# Patient Record
Sex: MALE | Race: WHITE | Employment: OTHER | URBAN - METROPOLITAN AREA
[De-identification: names, ages, dates, MRNs, and addresses within clinical notes are randomized per-mention and may not be internally consistent; named-entity substitution may affect disease eponyms.]

---

## 2017-02-16 RX ORDER — METOPROLOL SUCCINATE 200 MG/1
TABLET, EXTENDED RELEASE ORAL
Qty: 30 TABLET | Refills: 2 | Status: SHIPPED | OUTPATIENT
Start: 2017-02-16 | End: 2017-05-30

## 2017-04-14 NOTE — PROGRESS NOTES
Ann Navarro is a 68year old male. cc hypertension, hyperlipidemia, dementia, atrial fibrillation, pacemaker, renal insufficiency  HPI:   HTN - doing well with medications. No side effects. BP is controlled at home. Takes meds regularly.  Needs refi Take 1,000 mcg by mouth daily.  Disp:  Rfl:       Past Medical History   Diagnosis Date   • Herpes zoster without mention of complication    • Fitting and adjustment of cardiac pacemaker    • Personal history of pneumonia (recurrent)    • Other B-complex de meds.   Watch diet for fats and carbs. Stay active. Forward blood test results when done by cardiologist.  Increase hydration recommended 64 ounces of water every day. Continue activity and walking every day.   Call for refills of the medication as tello

## 2017-05-03 RX ORDER — MEMANTINE HYDROCHLORIDE 28 MG/1
CAPSULE, EXTENDED RELEASE ORAL
Qty: 90 CAPSULE | Refills: 0 | Status: SHIPPED | OUTPATIENT
Start: 2017-05-03 | End: 2017-08-07

## 2017-06-01 RX ORDER — METOPROLOL SUCCINATE 200 MG/1
TABLET, EXTENDED RELEASE ORAL
Qty: 90 TABLET | Refills: 0 | Status: SHIPPED | OUTPATIENT
Start: 2017-06-01 | End: 2017-06-02

## 2017-06-02 ENCOUNTER — TELEPHONE (OUTPATIENT)
Dept: FAMILY MEDICINE CLINIC | Facility: CLINIC | Age: 77
End: 2017-06-02

## 2017-06-02 RX ORDER — METOPROLOL SUCCINATE 200 MG/1
TABLET, EXTENDED RELEASE ORAL
Qty: 90 TABLET | Refills: 0 | Status: SHIPPED | OUTPATIENT
Start: 2017-06-02 | End: 2017-11-15

## 2017-06-02 NOTE — TELEPHONE ENCOUNTER
Pt is looking for refill of METOPROLOL SUCCINATE  MG Oral Tablet 24 Hr. Please see request from 6/1/17. Pt is almost out of the medication and wants to make sure has it for weekend.

## 2017-07-17 ENCOUNTER — TELEPHONE (OUTPATIENT)
Dept: FAMILY MEDICINE CLINIC | Facility: CLINIC | Age: 77
End: 2017-07-17

## 2017-07-17 ENCOUNTER — APPOINTMENT (OUTPATIENT)
Dept: GENERAL RADIOLOGY | Facility: HOSPITAL | Age: 77
End: 2017-07-17
Attending: EMERGENCY MEDICINE
Payer: MEDICARE

## 2017-07-17 ENCOUNTER — APPOINTMENT (OUTPATIENT)
Dept: CT IMAGING | Facility: HOSPITAL | Age: 77
End: 2017-07-17
Attending: EMERGENCY MEDICINE
Payer: MEDICARE

## 2017-07-17 PROCEDURE — 71010 XR CHEST AP PORTABLE  (CPT=71010): CPT | Performed by: EMERGENCY MEDICINE

## 2017-07-17 PROCEDURE — 70450 CT HEAD/BRAIN W/O DYE: CPT | Performed by: EMERGENCY MEDICINE

## 2017-07-17 NOTE — ED NOTES
Pt up to bathroom with assist from wife. Steady gait . Pt denies pain at this time. Awaiting ct scan results.

## 2017-07-17 NOTE — ED INITIAL ASSESSMENT (HPI)
Pt here with family whom stating pt has alzheimers and forgot how to get dressed or shower this morning. Pt mentioned to pt he felt funny from waist up. Son asked him if he was having chest pressure and pt answered yes. Pt had similar episode on Friday.

## 2017-07-17 NOTE — TELEPHONE ENCOUNTER
Pt went for walk Fri came home wife advised to take a shower he was very sweaty he took a shower and got out confused and even asked Em Vazquezer you my wife? \" he has been off since and this morning he could not figure out how to take a shower  And even get dres

## 2017-07-17 NOTE — ED PROVIDER NOTES
Patient Seen in: [facility] Emergency Department    History   Patient presents with:  Altered Mental Status (neurologic)    Stated Complaint: confusion    HPI    The patient is a 22-year-old male with a history of Alzheimer's disease, who resides at daily. Calcium Polycarbophil (FIBER LAXATIVE OR),  Take  by mouth. Coenzyme Q10 (CO Q-10) 200 MG Oral Cap,  Take  by mouth. Vitamin B-12 (VITAMIN B12) 1000 MCG Oral Tab,  Take 1,000 mcg by mouth daily.        Family History   Problem Relation Age of O auscultation bilaterally no wheezes or rales  Abdomen: Normoactive bowel sounds. Soft, nondistended. No HSM or masses. Nontender throughout abdomen. No CVA tenderness.   Extremities: No deformity, nontender throughout, and normal active range of motion of a on continuous pulse oximetry and cardiac telemetry. Blood was obtained and peripheral IV access was established. Chest x-ray was obtained.     [facility]       Xr Chest Ap Portable  (cpt=71010)    Result Date: 7/17/2017  PROCEDURE:  XR CHEST AP PORTABLE (CPT=710

## 2017-07-17 NOTE — TELEPHONE ENCOUNTER
Below notes \"stock\" should say stroke. S/w wife. Advised based on these sx's pt needs to be evaluated in the ER d/t hx. She voiced understanding and agrees.

## 2017-08-07 RX ORDER — MEMANTINE HYDROCHLORIDE 28 MG/1
CAPSULE, EXTENDED RELEASE ORAL
Qty: 90 CAPSULE | Refills: 0 | Status: SHIPPED | OUTPATIENT
Start: 2017-08-07 | End: 2017-11-06

## 2017-08-09 RX ORDER — MEMANTINE HYDROCHLORIDE 28 MG/1
CAPSULE, EXTENDED RELEASE ORAL
Qty: 90 CAPSULE | Refills: 0 | OUTPATIENT
Start: 2017-08-09

## 2017-08-28 NOTE — TELEPHONE ENCOUNTER
Please call pt for follow up medication with Dr. Randy Vargas prior to refill. Thanks. Was asked to come back in this fall.

## 2017-08-31 RX ORDER — METOPROLOL SUCCINATE 200 MG/1
TABLET, EXTENDED RELEASE ORAL
Qty: 90 TABLET | Refills: 0 | Status: SHIPPED | OUTPATIENT
Start: 2017-08-31 | End: 2017-09-07

## 2017-09-07 NOTE — PATIENT INSTRUCTIONS
Continue current meds. Watch diet for fats and carbs. Stay active. Do fasting blood work for wellness visit.

## 2017-09-07 NOTE — PROGRESS NOTES
John Pillai is a 68year old male. cc cholesterol elevated, Alzheimer dementia, history of stroke A. fib. HPI:   Hyperlipidemia patient is taking pravastatin doing well with medication.   He will repeat blood work for cholesterol before his next vi mention of complication    • High cholesterol    • Other B-complex deficiencies    • Pacemaker     Card Dr Rhina Bay   • Personal history of pneumonia (recurrent)    • Unspecified essential hypertension       Social History:  Smoking status: Former Smoker Chloride 107 101 - 111 mmol/L   CO2 29.0 22.0 - 32.0 mmol/L   -TROPONIN I   Result Value Ref Range   Troponin <0.046 <0.046 ng/mL   -URINALYSIS WITH CULTURE REFLEX   Result Value Ref Range   Urine Color Straw Yellow   Clarity Urine Clear Clear   Spec Gra %   Monocyte % 10.7 %   Eosinophil % 3.2 %   Basophil % 0.8 %   Immature Granulocyte % 0.1 %     CT of the brain, chest x-ray from July 17, 2017 ER visit reviewed.     ASSESSMENT AND PLAN:   Pure hypercholesterolemia  Late onset alzheimer's disease without

## 2017-09-28 DIAGNOSIS — R17 ELEVATED BILIRUBIN: ICD-10-CM

## 2017-09-28 DIAGNOSIS — N28.9 RENAL INSUFFICIENCY: Primary | ICD-10-CM

## 2017-11-08 RX ORDER — MEMANTINE HYDROCHLORIDE 28 MG/1
CAPSULE, EXTENDED RELEASE ORAL
Qty: 90 CAPSULE | Refills: 1 | Status: SHIPPED | OUTPATIENT
Start: 2017-11-08 | End: 2017-11-15

## 2017-11-08 NOTE — TELEPHONE ENCOUNTER
NAMENDA XR 28MG CAP    Not a per protocol medication.     Next OV is on 11*15*17    Rx is pending for your approval.    Please sign,    Thank you

## 2017-11-15 ENCOUNTER — OFFICE VISIT (OUTPATIENT)
Dept: FAMILY MEDICINE CLINIC | Facility: CLINIC | Age: 77
End: 2017-11-15

## 2017-11-15 VITALS
DIASTOLIC BLOOD PRESSURE: 80 MMHG | WEIGHT: 200 LBS | HEIGHT: 73 IN | RESPIRATION RATE: 12 BRPM | SYSTOLIC BLOOD PRESSURE: 130 MMHG | BODY MASS INDEX: 26.51 KG/M2 | TEMPERATURE: 97 F | HEART RATE: 88 BPM

## 2017-11-15 DIAGNOSIS — R17 ELEVATED BILIRUBIN: ICD-10-CM

## 2017-11-15 DIAGNOSIS — Z00.00 ENCOUNTER FOR ANNUAL HEALTH EXAMINATION: Primary | ICD-10-CM

## 2017-11-15 DIAGNOSIS — F02.80 LATE ONSET ALZHEIMER'S DISEASE WITHOUT BEHAVIORAL DISTURBANCE (HCC): ICD-10-CM

## 2017-11-15 DIAGNOSIS — I48.20 CHRONIC ATRIAL FIBRILLATION (HCC): ICD-10-CM

## 2017-11-15 DIAGNOSIS — G30.1 LATE ONSET ALZHEIMER'S DISEASE WITHOUT BEHAVIORAL DISTURBANCE (HCC): ICD-10-CM

## 2017-11-15 DIAGNOSIS — E78.00 PURE HYPERCHOLESTEROLEMIA: ICD-10-CM

## 2017-11-15 DIAGNOSIS — N28.9 RENAL INSUFFICIENCY: ICD-10-CM

## 2017-11-15 DIAGNOSIS — Z95.0 CARDIAC PACEMAKER IN SITU: ICD-10-CM

## 2017-11-15 PROCEDURE — 99214 OFFICE O/P EST MOD 30 MIN: CPT | Performed by: FAMILY MEDICINE

## 2017-11-15 PROCEDURE — G0444 DEPRESSION SCREEN ANNUAL: HCPCS | Performed by: FAMILY MEDICINE

## 2017-11-15 PROCEDURE — G0439 PPPS, SUBSEQ VISIT: HCPCS | Performed by: FAMILY MEDICINE

## 2017-11-15 RX ORDER — PRAVASTATIN SODIUM 40 MG
TABLET ORAL
Qty: 90 TABLET | Refills: 1 | Status: SHIPPED | OUTPATIENT
Start: 2017-11-15 | End: 2018-05-18

## 2017-11-15 RX ORDER — METOPROLOL SUCCINATE 200 MG/1
TABLET, EXTENDED RELEASE ORAL
Qty: 90 TABLET | Refills: 1 | Status: SHIPPED | OUTPATIENT
Start: 2017-11-15 | End: 2018-05-18

## 2017-11-15 RX ORDER — MEMANTINE HYDROCHLORIDE 28 MG/1
CAPSULE, EXTENDED RELEASE ORAL
Qty: 90 CAPSULE | Refills: 1 | Status: SHIPPED | OUTPATIENT
Start: 2017-11-15 | End: 2018-05-18

## 2017-11-15 NOTE — PATIENT INSTRUCTIONS
Continue current meds. Watch diet for fats and carbs. Stay active. Aby Gonzalez's SCREENING SCHEDULE   Tests on this list are recommended by your physician but may not be covered, or covered at this frequency, by your insurer.  Please check wi Screening Covered up to Age 76     Colonoscopy Screen   Covered every 10 years- more often if abnormal There are no preventive care reminders to display for this patient.  Update Health Maintenance if applicable    Flex Sigmoidoscopy Screen  Covered every 5 Tetanus Toxoid- Only covered with a cut with metal- TD and TDaP Not covered by Medicare Part B) No orders found for this or any previous visit.  This may be covered with your prescription benefits, but Medicare does not cover unless Medically needed    [facility]ost

## 2017-11-15 NOTE — PROGRESS NOTES
HPI:   Chi Chatterjee is a 68year old male who presents for a Medicare Subsequent Annual Wellness visit (Pt already had Initial Annual Wellness) also follow-up on hyperlipidemia A. fib cardiac pacemaker, renal insufficiency, dementia elevated bili behavioral disturbance      Last Cholesterol Labs:     Lab Results  Component Value Date   CHOLEST 172 09/27/2017   HDL 59 09/27/2017   LDL 98 09/27/2017   TRIG 67 09/27/2017          Last Chemistry Labs:     Lab Results  Component Value Date   AST 19 11/0 father. SOCIAL HISTORY:   He  reports that he quit smoking about 47 years ago. He does not have any smokeless tobacco history on file. He reports that he drinks alcohol. He reports that he does not use drugs.      REVIEW OF SYSTEMS:   GENERAL: feels well normal, no CVA tenderness   Lungs:   Clear to auscultation bilaterally, respirations unlabored   Chest Wall:  No tenderness or deformity   Heart:  Regular rate and rhythm, S1, S2 normal, no murmur,   Abdomen:   Soft, non-tender, bowel sounds active all fou Memantine HCl ER (NAMENDA XR) 28 MG Oral Capsule SR 24 Hr; TAKE ONE CAPSULE BY MOUTH ONCE DAILY    Pure hypercholesterolemia  -     Pravastatin Sodium 40 MG Oral Tab;  Take 1 tablet by mouth  daily    Chronic atrial fibrillation (HCC)  -     Metoprolol Appropriate Exercise    How would you describe your daily physical activity?: Moderate    How would you describe your current health state?: Good    How do you maintain positive mental well-being?: Social Interaction    If you are a male age 38-65 or a fem section provided for quick review of chart, separate sheet to patient  [address] Internal Lab or Procedure External Lab or Procedure   Diabetes Screening      HbgA1C   Annually No results found for: A1C    No flowsheet POTASSIUM (mmol/L)   Date Value   11/08/2017 5.0    No flowsheet data found. Creatinine  Annually CREATININE (mg/dL)   Date Value   11/08/2017 1.14    No flowsheet data found.     Drug Serum Conc  Annually No results found for: DIGOXIN, DIG, VALP No flow

## 2017-12-08 ENCOUNTER — TELEPHONE (OUTPATIENT)
Dept: FAMILY MEDICINE CLINIC | Facility: CLINIC | Age: 77
End: 2017-12-08

## 2017-12-08 ENCOUNTER — HOSPITAL ENCOUNTER (EMERGENCY)
Facility: HOSPITAL | Age: 77
Discharge: HOME | End: 2017-12-08
Attending: EMERGENCY MEDICINE
Payer: MEDICARE

## 2017-12-08 ENCOUNTER — APPOINTMENT (OUTPATIENT)
Dept: GENERAL RADIOLOGY | Facility: HOSPITAL | Age: 77
End: 2017-12-08
Attending: EMERGENCY MEDICINE
Payer: MEDICARE

## 2017-12-08 VITALS
HEIGHT: 72 IN | RESPIRATION RATE: 16 BRPM | WEIGHT: 198 LBS | OXYGEN SATURATION: 100 % | HEART RATE: 74 BPM | TEMPERATURE: 98 F | SYSTOLIC BLOOD PRESSURE: 124 MMHG | BODY MASS INDEX: 26.82 KG/M2 | DIASTOLIC BLOOD PRESSURE: 66 MMHG

## 2017-12-08 DIAGNOSIS — F02.80 ALZHEIMER'S DEMENTIA WITHOUT BEHAVIORAL DISTURBANCE, UNSPECIFIED TIMING OF DEMENTIA ONSET (HCC): ICD-10-CM

## 2017-12-08 DIAGNOSIS — R06.00 DOE (DYSPNEA ON EXERTION): Primary | ICD-10-CM

## 2017-12-08 DIAGNOSIS — G30.9 ALZHEIMER'S DEMENTIA WITHOUT BEHAVIORAL DISTURBANCE, UNSPECIFIED TIMING OF DEMENTIA ONSET (HCC): ICD-10-CM

## 2017-12-08 PROCEDURE — 81003 URINALYSIS AUTO W/O SCOPE: CPT | Performed by: EMERGENCY MEDICINE

## 2017-12-08 PROCEDURE — 80053 COMPREHEN METABOLIC PANEL: CPT | Performed by: EMERGENCY MEDICINE

## 2017-12-08 PROCEDURE — 82962 GLUCOSE BLOOD TEST: CPT

## 2017-12-08 PROCEDURE — 85025 COMPLETE CBC W/AUTO DIFF WBC: CPT

## 2017-12-08 PROCEDURE — 93010 ELECTROCARDIOGRAM REPORT: CPT

## 2017-12-08 PROCEDURE — 85025 COMPLETE CBC W/AUTO DIFF WBC: CPT | Performed by: EMERGENCY MEDICINE

## 2017-12-08 PROCEDURE — 36415 COLL VENOUS BLD VENIPUNCTURE: CPT

## 2017-12-08 PROCEDURE — 71010 XR CHEST AP PORTABLE  (CPT=71010): CPT | Performed by: EMERGENCY MEDICINE

## 2017-12-08 PROCEDURE — 99285 EMERGENCY DEPT VISIT HI MDM: CPT

## 2017-12-08 PROCEDURE — 80053 COMPREHEN METABOLIC PANEL: CPT

## 2017-12-08 PROCEDURE — 84484 ASSAY OF TROPONIN QUANT: CPT | Performed by: EMERGENCY MEDICINE

## 2017-12-08 PROCEDURE — 93005 ELECTROCARDIOGRAM TRACING: CPT

## 2017-12-08 RX ORDER — ASPIRIN 81 MG/1
324 TABLET, CHEWABLE ORAL ONCE
Status: COMPLETED | OUTPATIENT
Start: 2017-12-08 | End: 2017-12-08

## 2017-12-08 NOTE — TELEPHONE ENCOUNTER
The patient having heart condition that needs to be checked . The best choice would be to go to the emergency room for evaluation. They can do the imaging test EKG make sure things are okay and get a blood work done right away.   I understand patient ruizi

## 2017-12-08 NOTE — TELEPHONE ENCOUNTER
Pt wife transferred to nurse last night pt was much more confused than normal. He went on a walk like normal this morning and became very short of breath and dizzy.

## 2017-12-08 NOTE — TELEPHONE ENCOUNTER
Spoke with shanae (ruthie)/spouse-listed on [employer] consent-reports pt had onset approx 5pm yesterday of increase in confusion. Khadijah Dutta was talking about our house but did not seem to realize that is where he was-said 'i have a house just like this one'.  Wa

## 2017-12-08 NOTE — ED INITIAL ASSESSMENT (HPI)
Patient arrives from home with wife who reports increased confusion last night worse than typical Alzheimer's symptoms, associated with agitation.  Wife reports patient seemed back to baseline this am. Reports while on a walk today, patient c/o difficulty b

## 2017-12-08 NOTE — ED NOTES
Pt reevaluated by er physician. Pt and family all informed of all test reports and plan of care. All verbalizing understanding.

## 2017-12-08 NOTE — TELEPHONE ENCOUNTER
Per discussion with dr [facility] with increased confusion and additional new symptoms this morning, recommend ER for quickest evaluation and treatment. Should prob have labs, poss cxr to r/o pneumonia and check urine for poss uti.   Call to ruthie/otilio

## 2017-12-08 NOTE — ED PROVIDER NOTES
Patient Seen in: [facility] Emergency Department    History   Patient presents with:  Dizziness (neurologic)  Dyspnea TIM SOB (respiratory)    Stated Complaint: dizzy, short of breath confusion    HPI  Patient is a 69-year-old male who has a history of breath confusion  Other systems are as noted in HPI. Constitutional and vital signs reviewed. All other systems reviewed and negative except as noted above.     Physical Exam   ED Triage Vitals [12/08/17 1143]  BP: 126/80  Pulse: 93  Resp: 20  Temp Status                     ---------                               -----------         ------                     CBC W/ DIFFERENTIAL[[id number]]          Abnormal            Final result                 Please view results for these tests on the individual onset    Disposition:  Discharge  12/8/2017  2:42 pm    Follow-up:  Dena Lopez  [address] [id number] [phone]    In 3 days          Medications Prescribed:  Discharge Medication List as of 12/8/2017  2:45 PM

## 2018-02-14 ENCOUNTER — TELEPHONE (OUTPATIENT)
Dept: FAMILY MEDICINE CLINIC | Facility: CLINIC | Age: 78
End: 2018-02-14

## 2018-02-14 NOTE — TELEPHONE ENCOUNTER
Wife called, stated that she is over whelmed. Her  has been declining since December. He no longer recognizes her as his wife (they have been  over 48 years).   Pt thinks that she is his care giver and he keeps getting agitated and asking he

## 2018-05-18 NOTE — PROGRESS NOTES
Roe Charo is a 68year old male. cc cholesterol elevated, Alzheimer dementia, history of stroke A. fib. HPI:   Hyperlipidemia patient is taking pravastatin doing well with medication.   He will repeat blood work for [employer] Personal history of pneumonia (recurrent)    • Unspecified essential hypertension       Social History:  Smoking status: Former Smoker                                                              Packs/day: 0.00      Years: 0.00         Quit date: 1/1/1970 -TROPONIN I   Result Value Ref Range   Troponin <0.046 <0.046 ng/mL   -URINALYSIS WITH CULTURE REFLEX   Result Value Ref Range   Urine Color Yellow Yellow   Clarity Urine Clear Clear   Spec Gravity 1.009 1.001 - 1.030   Glucose Urine Negative Negative mg % 22.4 %   Monocyte % 8.5 %   Eosinophil % 1.1 %   Basophil % 0.6 %   Immature Granulocyte % 0.4 %       ASSESSMENT AND PLAN:   Pure hypercholesterolemia  Chronic atrial fibrillation (hcc)  Late onset alzheimer's disease with behavioral disturbance  (prima

## 2018-06-02 DIAGNOSIS — G30.1 LATE ONSET ALZHEIMER'S DISEASE WITHOUT BEHAVIORAL DISTURBANCE (HCC): ICD-10-CM

## 2018-06-02 DIAGNOSIS — F02.80 LATE ONSET ALZHEIMER'S DISEASE WITHOUT BEHAVIORAL DISTURBANCE (HCC): ICD-10-CM

## 2018-06-04 RX ORDER — MEMANTINE HYDROCHLORIDE 28 MG/1
CAPSULE, EXTENDED RELEASE ORAL
Qty: 90 CAPSULE | Refills: 1 | OUTPATIENT
Start: 2018-06-04

## 2018-06-18 ENCOUNTER — TELEPHONE (OUTPATIENT)
Dept: FAMILY MEDICINE CLINIC | Facility: CLINIC | Age: 78
End: 2018-06-18

## 2018-06-18 NOTE — TELEPHONE ENCOUNTER
I would use alprazolam 0.25 mg 1 tablet twice a day take 1 tablet  between 2 and 3 PM.  Take second tablet at nighttime when he would be getting more agitated again . Try to keep  3-  4 hours between the doses. Thanks.

## 2018-06-18 NOTE — TELEPHONE ENCOUNTER
Pt has been taking Xanax . 25 once a day between 2:00 and 3:00 p.m. Dr. Shannon Becerra told pt he could increase to twice a day if needed. Wife is asking when pt should take second dose:  Double dose at same time or take at a different time.   Wife states pt

## 2018-07-10 ENCOUNTER — TELEPHONE (OUTPATIENT)
Dept: FAMILY MEDICINE CLINIC | Facility: CLINIC | Age: 78
End: 2018-07-10

## 2018-07-10 NOTE — TELEPHONE ENCOUNTER
Nurse is advising --    Home health will visit after d/c from hospital today    Advised to faxed paperworks/orders needing signature in the office

## 2018-09-12 DIAGNOSIS — F41.9 ANXIETY: ICD-10-CM

## 2018-09-12 DIAGNOSIS — G30.1 LATE ONSET ALZHEIMER'S DISEASE WITH BEHAVIORAL DISTURBANCE (HCC): ICD-10-CM

## 2018-09-12 DIAGNOSIS — F02.81 LATE ONSET ALZHEIMER'S DISEASE WITH BEHAVIORAL DISTURBANCE (HCC): ICD-10-CM

## 2018-09-13 RX ORDER — ALPRAZOLAM 0.25 MG/1
TABLET ORAL
Qty: 60 TABLET | Refills: 2 | Status: SHIPPED | OUTPATIENT
Start: 2018-09-13 | End: 2019-02-12

## 2018-09-13 NOTE — TELEPHONE ENCOUNTER
Not protocol medication. LOV :5/18/18 medication follow up   Last labs done :9/27/17  Next appointment :11/21/18  Please see pending medication. Refill if appropriate.    Last refill:    Date:5/18/18  Amount :60 tablets 2 refills   Medication: alprazolam

## 2018-10-18 PROBLEM — Z86.73 HISTORY OF ISCHEMIC VERTEBROBASILAR ARTERY THALAMIC STROKE: Status: ACTIVE | Noted: 2018-10-18

## 2018-10-18 PROBLEM — Z71.89 DNR (DO NOT RESUSCITATE) DISCUSSION: Status: ACTIVE | Noted: 2018-10-18

## 2018-10-18 NOTE — PROGRESS NOTES
Ángel Franks is a 66year old male. cc Alzheimer dementia, hypertension, hyperlipidemia, A. fib, pacemaker, discuss CODE STATUS.  HPI:   Pt  is coming to the office for follow-up visit. Patient was diagnosed with Alzheimer dementia.   His family dec Outpatient Medications:  Metoprolol Succinate  MG Oral Tablet 24 Hr Take 100 mg by mouth daily. Disp:  Rfl:    Metoprolol Succinate ER 50 MG Oral Tablet 24 Hr Take 50 mg by mouth daily.  Disp:  Rfl:    ALPRAZOLAM 0.25 MG Oral Tab TAKE 1 TABLET BY MOUT headaches   Psych oriented to himself not oriented to place or time    EXAM:   /60 (BP Location: Left arm, Patient Position: Sitting, Cuff Size: adult)   Pulse 76   Temp 96.8 °F (36 °C) (Oral)   Resp 14   Ht 73\"   Wt 212 lb   BMI 27.97 kg/m²   GENER ADJUVANT IM    The patient indicates understanding of these issues and agrees to the plan. The patient is asked to return in prn.    Time spent was 55 min, more than 50% was spent on counseling regarding medical conditions, further testing continuation of

## 2019-01-24 ENCOUNTER — TELEPHONE (OUTPATIENT)
Dept: FAMILY MEDICINE CLINIC | Facility: CLINIC | Age: 79
End: 2019-01-24

## 2019-01-24 NOTE — TELEPHONE ENCOUNTER
Spoke with pt's wife. She states her  is in a memory care facility. A doctor at the facility is taking care of him now.

## 2019-02-12 DIAGNOSIS — F02.81 LATE ONSET ALZHEIMER'S DISEASE WITH BEHAVIORAL DISTURBANCE (HCC): ICD-10-CM

## 2019-02-12 DIAGNOSIS — F41.9 ANXIETY: ICD-10-CM

## 2019-02-12 DIAGNOSIS — G30.1 LATE ONSET ALZHEIMER'S DISEASE WITH BEHAVIORAL DISTURBANCE (HCC): ICD-10-CM

## 2019-02-13 RX ORDER — ALPRAZOLAM 0.25 MG/1
TABLET ORAL
Qty: 60 TABLET | Refills: 1 | Status: SHIPPED | OUTPATIENT
Start: 2019-02-13

## 2019-02-13 NOTE — TELEPHONE ENCOUNTER
ALPRAZOLAM 0.25MG TAB TABLET    Non protocol medication. Please see pended medications. Please sign & print if appropriate. Thank you    His last refill was sent on 09/13/2018.

## 2019-06-12 DIAGNOSIS — F02.81 LATE ONSET ALZHEIMER'S DISEASE WITH BEHAVIORAL DISTURBANCE (HCC): ICD-10-CM

## 2019-06-12 DIAGNOSIS — G30.1 LATE ONSET ALZHEIMER'S DISEASE WITH BEHAVIORAL DISTURBANCE (HCC): ICD-10-CM

## 2019-06-12 DIAGNOSIS — F41.9 ANXIETY: ICD-10-CM

## 2019-06-12 RX ORDER — ALPRAZOLAM 0.25 MG/1
TABLET ORAL
Qty: 60 TABLET | OUTPATIENT
Start: 2019-06-12

## 2019-06-14 DIAGNOSIS — G30.1 LATE ONSET ALZHEIMER'S DISEASE WITH BEHAVIORAL DISTURBANCE (HCC): ICD-10-CM

## 2019-06-14 DIAGNOSIS — F02.81 LATE ONSET ALZHEIMER'S DISEASE WITH BEHAVIORAL DISTURBANCE (HCC): ICD-10-CM

## 2019-06-14 DIAGNOSIS — F41.9 ANXIETY: ICD-10-CM

## 2019-06-14 RX ORDER — ALPRAZOLAM 0.25 MG/1
TABLET ORAL
Qty: 60 TABLET | Refills: 0 | OUTPATIENT
Start: 2019-06-14

## (undated) NOTE — ED AVS SNAPSHOT
Kings Buchanan   MRN: [id number]    Department:  [facility] Emergency Department   Date of Visit:  12/8/2017           Disclosure     Insurance plans vary and the physician(s) referred by the ER may not be covered by your plan.  Please contac tell this physician (or your personal doctor if your instructions are to return to your personal doctor) about any new or lasting problems. The primary care or specialist physician will see patients referred from the [facility] Emergency Department.  Donavan Singh

## (undated) NOTE — MR AVS SNAPSHOT
[facility], [address] [id number] [phone]               Thank you for choosing us for your health care visit with Raz Francisco MD.  We are glad to serve you and happy to provide you with this brewer Support Staff. Remember, [employer] is NOT to be used for urgent needs. For medical emergencies, dial 911. Visit [email or web address] to learn more.         Educational Information     Your blood pressure indicates you may be at-risk for Hypertensi active are less likely to develop some chronic diseases than adults who are inactive.      HOW TO GET STARTED: HOW TO STAY MOTIVATED:   Start activities slowly and build up over time Do what you like   Get your heart pumping – brisk walking, biking, swimmin

## (undated) NOTE — ED AVS SNAPSHOT
[facility] Emergency Department  [place]  [address]  [facility] [address]  Phone:  [phone]  Fax:  [address] [facility]   MRN: [id number]    Department:  [facility] Emergency Department   Date of Visit:  7/17/2017 IF THERE IS ANY CHANGE OR WORSENING OF YOUR CONDITION, CALL YOUR PRIMARY CARE PHYSICIAN AT ONCE OR RETURN IMMEDIATELY TO THE EMERGENCY DEPARTMENT.     If you have been prescribed any medication(s), please fill your prescription right away and begin taking t